# Patient Record
Sex: FEMALE | Race: WHITE | NOT HISPANIC OR LATINO | Employment: OTHER | URBAN - NONMETROPOLITAN AREA
[De-identification: names, ages, dates, MRNs, and addresses within clinical notes are randomized per-mention and may not be internally consistent; named-entity substitution may affect disease eponyms.]

---

## 2017-04-07 ENCOUNTER — OFFICE VISIT (OUTPATIENT)
Dept: RETAIL CLINIC | Facility: CLINIC | Age: 57
End: 2017-04-07

## 2017-04-07 DIAGNOSIS — L03.032 CELLULITIS OF GREAT TOE OF LEFT FOOT: Primary | ICD-10-CM

## 2017-04-07 PROCEDURE — 99203 OFFICE O/P NEW LOW 30 MIN: CPT | Performed by: NURSE PRACTITIONER

## 2017-04-07 RX ORDER — CEPHALEXIN 500 MG/1
500 CAPSULE ORAL 3 TIMES DAILY
Qty: 30 CAPSULE | Refills: 0 | Status: SHIPPED | OUTPATIENT
Start: 2017-04-07

## 2017-04-08 VITALS — TEMPERATURE: 98.1 F | HEART RATE: 78 BPM | OXYGEN SATURATION: 99 % | RESPIRATION RATE: 18 BRPM

## 2017-04-08 NOTE — PROGRESS NOTES
"Subjective   Evelia Reed is a 57 y.o. female.   Chief Complaint   Patient presents with   • Toe Pain      HPI Comments: Approx 4 years ago Ms. Reed dropped a can of soup on her left great toe.  Some time after injury the toenail came off on its own and a new nail grew back.  She never saw anyone after this injury.  Since that time, she reports the nail has never looked \"normal\".  There have been times when the nail/skin around nail have been red and inflamed, but never to the degree it is now.   She reports she did not have any trouble until 2 days ago.  Redness and pain has progressively worsened.  She reports she gave herself a pedicure a few days ago that included \"pushing back her cuticle and clipping it off with small scissors\" in her pedicure home set.   No fever or other symptoms.  Has not noticed any drainage. She did take some tylenol for pain today which has helped some.      Toe Pain           The following portions of the patient's history were reviewed and updated as appropriate: allergies, current medications, past family history, past medical history, past social history, past surgical history and problem list.    Current Outpatient Prescriptions:   •  cephalexin (KEFLEX) 500 MG capsule, Take 1 capsule by mouth 3 (Three) Times a Day., Disp: 30 capsule, Rfl: 0    Review of Systems   Constitutional: Negative for chills, fatigue and fever.   Gastrointestinal: Negative for diarrhea, nausea and vomiting.   Skin: Negative for rash.        Swelling, warmth, redness of left great toe     Psychiatric/Behavioral: Positive for sleep disturbance (due to pain only).     Pulse 78  Temp 98.1 °F (36.7 °C)  Resp 18  SpO2 99%    Objective   Allergies   Allergen Reactions   • Sulfa Antibiotics        Physical Exam   Constitutional: She appears well-developed and well-nourished. She is cooperative. She does not appear ill. No distress.       Vascular Status -  Her exam exhibits no right foot edema. Her exam " exhibits no left foot edema.   Skin Integrity  -  Her right foot skin is intact.     Evelia 's left foot skin is intact. .  Neurological: She is alert.   Skin: No rash noted. There is erythema. No cyanosis. Nails show no clubbing.   Moderate erythema and warmth with mild edema around nailbed of left great toe.  No drainage or areas of fluctuance.   Skin intact.  Nail slightly thickened and yellow with some irregular growth pattern near nailbed/cuticle.   Erythema/edema localized to great toe cuticle only.        Assessment/Plan   Evelia was seen today for toe pain.    Diagnoses and all orders for this visit:    Cellulitis of great toe of left foot    Other orders  -     cephalexin (KEFLEX) 500 MG capsule; Take 1 capsule by mouth 3 (Three) Times a Day.

## 2017-04-08 NOTE — PATIENT INSTRUCTIONS
Warm soaks and cleanse with antibacterial soap and water and keep dry.   Take all antibiotics as directed.  Follow up with PCP next week if no improvement or if symptoms worsen.  Due to chronicity of problem with this toe, I would recommend podiatry follow up in the next several weeks.

## 2020-07-31 ENCOUNTER — APPOINTMENT (OUTPATIENT)
Dept: WOMENS IMAGING | Facility: HOSPITAL | Age: 60
End: 2020-07-31

## 2020-07-31 PROCEDURE — 77067 SCR MAMMO BI INCL CAD: CPT | Performed by: RADIOLOGY

## 2020-07-31 PROCEDURE — 77063 BREAST TOMOSYNTHESIS BI: CPT | Performed by: RADIOLOGY

## 2020-08-25 ENCOUNTER — APPOINTMENT (OUTPATIENT)
Dept: WOMENS IMAGING | Facility: HOSPITAL | Age: 60
End: 2020-08-25

## 2020-08-25 PROCEDURE — 76641 ULTRASOUND BREAST COMPLETE: CPT | Performed by: RADIOLOGY

## 2020-08-25 PROCEDURE — 77061 BREAST TOMOSYNTHESIS UNI: CPT | Performed by: RADIOLOGY

## 2020-08-25 PROCEDURE — 77065 DX MAMMO INCL CAD UNI: CPT | Performed by: RADIOLOGY

## 2021-08-23 ENCOUNTER — APPOINTMENT (OUTPATIENT)
Dept: WOMENS IMAGING | Facility: HOSPITAL | Age: 61
End: 2021-08-23

## 2021-08-23 PROCEDURE — 77067 SCR MAMMO BI INCL CAD: CPT | Performed by: RADIOLOGY

## 2021-08-23 PROCEDURE — 77063 BREAST TOMOSYNTHESIS BI: CPT | Performed by: RADIOLOGY

## 2022-10-14 ENCOUNTER — APPOINTMENT (OUTPATIENT)
Dept: WOMENS IMAGING | Facility: HOSPITAL | Age: 62
End: 2022-10-14

## 2022-10-14 PROCEDURE — 77067 SCR MAMMO BI INCL CAD: CPT | Performed by: RADIOLOGY

## 2022-10-14 PROCEDURE — 77063 BREAST TOMOSYNTHESIS BI: CPT | Performed by: RADIOLOGY
